# Patient Record
Sex: MALE | Race: BLACK OR AFRICAN AMERICAN | NOT HISPANIC OR LATINO | Employment: FULL TIME | URBAN - METROPOLITAN AREA
[De-identification: names, ages, dates, MRNs, and addresses within clinical notes are randomized per-mention and may not be internally consistent; named-entity substitution may affect disease eponyms.]

---
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
## 2021-08-03 ENCOUNTER — OFFICE VISIT (OUTPATIENT)
Dept: URGENT CARE | Facility: CLINIC | Age: 21
End: 2021-08-03
Payer: MEDICAID

## 2021-08-03 VITALS
HEIGHT: 75 IN | TEMPERATURE: 97 F | HEART RATE: 69 BPM | OXYGEN SATURATION: 98 % | SYSTOLIC BLOOD PRESSURE: 122 MMHG | WEIGHT: 165 LBS | RESPIRATION RATE: 19 BRPM | DIASTOLIC BLOOD PRESSURE: 81 MMHG | BODY MASS INDEX: 20.51 KG/M2

## 2021-08-03 DIAGNOSIS — Z71.89 EDUCATED ABOUT COVID-19 VIRUS INFECTION: ICD-10-CM

## 2021-08-03 DIAGNOSIS — R06.7 SNEEZING: ICD-10-CM

## 2021-08-03 DIAGNOSIS — Z11.52 ENCOUNTER FOR SCREENING FOR COVID-19: ICD-10-CM

## 2021-08-03 DIAGNOSIS — R05.8 DRY COUGH: Primary | ICD-10-CM

## 2021-08-03 LAB
CTP QC/QA: YES
SARS-COV-2 RDRP RESP QL NAA+PROBE: NEGATIVE

## 2021-08-03 PROCEDURE — 99203 PR OFFICE/OUTPT VISIT, NEW, LEVL III, 30-44 MIN: ICD-10-PCS | Mod: S$GLB,CS,, | Performed by: PHYSICIAN ASSISTANT

## 2021-08-03 PROCEDURE — U0002 COVID-19 LAB TEST NON-CDC: HCPCS | Mod: QW,S$GLB,, | Performed by: PHYSICIAN ASSISTANT

## 2021-08-03 PROCEDURE — 99203 OFFICE O/P NEW LOW 30 MIN: CPT | Mod: S$GLB,CS,, | Performed by: PHYSICIAN ASSISTANT

## 2021-08-03 PROCEDURE — U0002: ICD-10-PCS | Mod: QW,S$GLB,, | Performed by: PHYSICIAN ASSISTANT

## 2023-08-15 ENCOUNTER — OFFICE VISIT (OUTPATIENT)
Dept: URGENT CARE | Facility: CLINIC | Age: 23
End: 2023-08-15
Payer: MEDICAID

## 2023-08-15 VITALS
BODY MASS INDEX: 20.51 KG/M2 | HEIGHT: 75 IN | SYSTOLIC BLOOD PRESSURE: 124 MMHG | WEIGHT: 165 LBS | HEART RATE: 60 BPM | RESPIRATION RATE: 15 BRPM | TEMPERATURE: 98 F | OXYGEN SATURATION: 100 % | DIASTOLIC BLOOD PRESSURE: 84 MMHG

## 2023-08-15 DIAGNOSIS — Z11.3 SCREENING FOR STDS (SEXUALLY TRANSMITTED DISEASES): Primary | ICD-10-CM

## 2023-08-15 LAB
BILIRUB UR QL STRIP: NEGATIVE
GLUCOSE UR QL STRIP: NEGATIVE
KETONES UR QL STRIP: NEGATIVE
LEUKOCYTE ESTERASE UR QL STRIP: NEGATIVE
PH, POC UA: 8 (ref 5–8)
POC BLOOD, URINE: NEGATIVE
POC NITRATES, URINE: NEGATIVE
PROT UR QL STRIP: NEGATIVE
SP GR UR STRIP: 1.01 (ref 1–1.03)
UROBILINOGEN UR STRIP-ACNC: POSITIVE (ref 0.3–2.2)

## 2023-08-15 PROCEDURE — 81003 POCT URINALYSIS, DIPSTICK, AUTOMATED, W/O SCOPE: ICD-10-PCS | Mod: QW,S$GLB,, | Performed by: NURSE PRACTITIONER

## 2023-08-15 PROCEDURE — 36415 COLL VENOUS BLD VENIPUNCTURE: CPT | Performed by: NURSE PRACTITIONER

## 2023-08-15 PROCEDURE — 99213 OFFICE O/P EST LOW 20 MIN: CPT | Mod: S$GLB,,, | Performed by: NURSE PRACTITIONER

## 2023-08-15 PROCEDURE — 87591 N.GONORRHOEAE DNA AMP PROB: CPT | Performed by: NURSE PRACTITIONER

## 2023-08-15 PROCEDURE — 80074 ACUTE HEPATITIS PANEL: CPT | Performed by: NURSE PRACTITIONER

## 2023-08-15 PROCEDURE — 87389 HIV-1 AG W/HIV-1&-2 AB AG IA: CPT | Performed by: NURSE PRACTITIONER

## 2023-08-15 PROCEDURE — 87661 TRICHOMONAS VAGINALIS AMPLIF: CPT | Performed by: NURSE PRACTITIONER

## 2023-08-15 PROCEDURE — 99213 PR OFFICE/OUTPT VISIT, EST, LEVL III, 20-29 MIN: ICD-10-PCS | Mod: S$GLB,,, | Performed by: NURSE PRACTITIONER

## 2023-08-15 PROCEDURE — 86592 SYPHILIS TEST NON-TREP QUAL: CPT | Performed by: NURSE PRACTITIONER

## 2023-08-15 PROCEDURE — 81003 URINALYSIS AUTO W/O SCOPE: CPT | Mod: QW,S$GLB,, | Performed by: NURSE PRACTITIONER

## 2023-08-15 NOTE — PROGRESS NOTES
"Subjective:      Patient ID: Mello Grant is a 23 y.o. male.    Vitals:  height is 6' 3" (1.905 m) and weight is 74.8 kg (165 lb). His temperature is 98 °F (36.7 °C). His blood pressure is 124/84 and his pulse is 60. His respiration is 15 and oxygen saturation is 100%.     Chief Complaint: STD Check    23 year old male presents today with with concerns for STDs. States he is not having any symptoms but would like to be tested for everything. Denies any exposure    Male  Problem  The patient's pertinent negatives include no genital injury, genital itching, genital lesions, pelvic pain, penile discharge, penile pain, priapism, scrotal swelling or testicular pain. This is a new problem. The current episode started in the past 7 days. The problem has been unchanged. The patient is experiencing no pain. Nothing aggravates the symptoms. He has tried nothing for the symptoms.       Genitourinary:  Negative for penile discharge, penile pain, scrotal swelling, testicular pain and pelvic pain.      Objective:     Physical Exam   Constitutional: He is oriented to person, place, and time. He appears well-developed. No distress.   HENT:   Head: Normocephalic and atraumatic.   Ears:   Right Ear: External ear normal.   Left Ear: External ear normal.   Nose: Nose normal. No nasal deformity. No epistaxis.   Mouth/Throat: Oropharynx is clear and moist and mucous membranes are normal.   Eyes: Conjunctivae and lids are normal.   Neck: Trachea normal and phonation normal. Neck supple.   Cardiovascular: Normal rate, regular rhythm and normal heart sounds.   Pulmonary/Chest: Effort normal and breath sounds normal.   Abdominal: Normal appearance and bowel sounds are normal. He exhibits no distension. Soft. There is no abdominal tenderness.   Musculoskeletal: Normal range of motion.         General: Normal range of motion.   Neurological: He is alert and oriented to person, place, and time. He has normal reflexes.   Skin: Skin is warm, " dry, intact and not diaphoretic.   Psychiatric: His speech is normal and behavior is normal. Judgment and thought content normal.   Nursing note and vitals reviewed.    Results for orders placed or performed in visit on 08/15/23   POCT Urinalysis, Dipstick, Automated, W/O Scope   Result Value Ref Range    POC Blood, Urine Negative Negative    POC Bilirubin, Urine Negative Negative    POC Urobilinogen, Urine POSITIVE 0.3 - 2.2    POC Ketones, Urine Negative Negative    POC Protein, Urine Negative Negative    POC Nitrates, Urine Negative Negative    POC Glucose, Urine Negative Negative    pH, UA 8.0 5 - 8    POC Specific Gravity, Urine 1.015 1.003 - 1.029    POC Leukocytes, Urine Negative Negative         Patient in no acute distress.  Vitals reassuring.Pt asymptomatic. STD precautions discussed.   Discussed results/diagnosis/plan in depth with patient in clinic. Strict precautions given to patient to monitor for worsening signs and symptoms. Advised to follow up with primary.All questions answered. Strict ER precautions given. If your symptoms worsens or fail to improve you should go to the Emergency Room. Discharge and follow-up instructions given verbally/printed. Discharge and follow-up instructions discussed with the patient who expressed understanding and willingness to comply with my recommendations.Patient voiced understanding and in agreement with current treatment plan.     Please be advised this text was dictated with [employer] software and may contain errors due to translation.    Assessment:     1. Screening for STDs (sexually transmitted diseases)        Plan:       Screening for STDs (sexually transmitted diseases)  -     POCT Urinalysis, Dipstick, Automated, W/O Scope  -     C. trachomatis/N. gonorrhoeae by AMP DNA [facility]; Urine  -     Trichomonas vaginalis, RNA, Qual, Urine  -     HIV 1/2 Ag/Ab (4th Gen)  -     RPR  -     Hepatitis Panel, Acute                  Patient Instructions   STD Screening  You  were tested for STDs today, we will call you in 5-7 days with the results of the testing    If you need more medication when the labs result come in, we will call you and phone them in for you.    Increase condom use to prevent occurance.      IF POSITIVE:  Notify sexual partners of the need for testing.    Complete ALL medication prescribed.    NO sexual intercourse for 7 days after treatment.   Retest to ensure infection has cleared-there are infections that require more agressive treatment.  Retest for all in 6 weeks and again in 6 months to ensure true negative results.      Today's testing will give no crediable information if you have unprotected sexual activities going forward.     Syphillis cases are rising!    Gonorrhea has RESISTANT strains which is why repeat testing after treatment is important.    Gonorrhea may be present in multiple sites from just ONE area of exposure.      REMEMBER WEAR CONDOMS AND GET TESTED OFTEN.

## 2023-08-15 NOTE — PATIENT INSTRUCTIONS
STD Screening  You were tested for STDs today, we will call you in 5-7 days with the results of the testing    If you need more medication when the labs result come in, we will call you and phone them in for you.    Increase condom use to prevent occurance.      IF POSITIVE:  Notify sexual partners of the need for testing.    Complete ALL medication prescribed.    NO sexual intercourse for 7 days after treatment.   Retest to ensure infection has cleared-there are infections that require more agressive treatment.  Retest for all in 6 weeks and again in 6 months to ensure true negative results.      Today's testing will give no crediable information if you have unprotected sexual activities going forward.     Syphillis cases are rising!    Gonorrhea has RESISTANT strains which is why repeat testing after treatment is important.    Gonorrhea may be present in multiple sites from just ONE area of exposure.      REMEMBER WEAR CONDOMS AND GET TESTED OFTEN.

## 2023-08-17 LAB
C TRACH DNA SPEC QL NAA+PROBE: NOT DETECTED
HAV IGM SERPL QL IA: NORMAL
HBV CORE IGM SERPL QL IA: NORMAL
HBV SURFACE AG SERPL QL IA: NORMAL
HCV AB SERPL QL IA: NORMAL
HIV 1+2 AB+HIV1 P24 AG SERPL QL IA: NORMAL
N GONORRHOEA DNA SPEC QL NAA+PROBE: NOT DETECTED
RPR SER QL: NORMAL
SPECIMEN SOURCE: NORMAL
T VAGINALIS RRNA SPEC QL NAA+PROBE: NEGATIVE

## 2023-08-18 ENCOUNTER — TELEPHONE (OUTPATIENT)
Dept: URGENT CARE | Facility: CLINIC | Age: 23
End: 2023-08-18
Payer: MEDICAID

## 2023-08-18 NOTE — TELEPHONE ENCOUNTER
Called patient to give negative test results from previous visit.  He is asymptomatic and requested STD screen.  Reiterated STD precautions.  Answered all questions.  Patient verbalized understanding and agree plan of care.      Results for orders placed or performed in visit on 08/15/23   C. trachomatis/N. gonorrhoeae by AMP DNA [facility]; Urine    Specimen: Genital   Result Value Ref Range    Chlamydia, Amplified DNA Not Detected Not Detected    N gonorrhoeae, amplified DNA Not Detected Not Detected   Trichomonas vaginalis, RNA, Qual, Urine    Specimen: Urine   Result Value Ref Range    Trichomonas vaginalis RNA, Qual, source Urine     Trichomonas vaginalis, QL, TMA Negative Negative   HIV 1/2 Ag/Ab (4th Gen)   Result Value Ref Range    HIV 1/2 Ag/Ab Non-reactive Non-reactive   RPR   Result Value Ref Range    RPR Non-reactive Non-reactive   Hepatitis Panel, Acute   Result Value Ref Range    Hepatitis B Surface Ag Non-reactive Non-reactive    Hep B C IgM Non-reactive Non-reactive    Hep A IgM Non-reactive Non-reactive    Hepatitis C Ab Non-reactive Non-reactive   POCT Urinalysis, Dipstick, Automated, W/O Scope   Result Value Ref Range    POC Blood, Urine Negative Negative    POC Bilirubin, Urine Negative Negative    POC Urobilinogen, Urine POSITIVE 0.3 - 2.2    POC Ketones, Urine Negative Negative    POC Protein, Urine Negative Negative    POC Nitrates, Urine Negative Negative    POC Glucose, Urine Negative Negative    pH, UA 8.0 5 - 8    POC Specific Gravity, Urine 1.015 1.003 - 1.029    POC Leukocytes, Urine Negative Negative

## 2024-05-21 ENCOUNTER — OFFICE VISIT (OUTPATIENT)
Dept: URGENT CARE | Facility: CLINIC | Age: 24
End: 2024-05-21
Payer: MEDICAID

## 2024-05-21 VITALS
HEART RATE: 98 BPM | WEIGHT: 164.88 LBS | HEIGHT: 75 IN | TEMPERATURE: 98 F | DIASTOLIC BLOOD PRESSURE: 78 MMHG | RESPIRATION RATE: 17 BRPM | SYSTOLIC BLOOD PRESSURE: 118 MMHG | OXYGEN SATURATION: 96 % | BODY MASS INDEX: 20.5 KG/M2

## 2024-05-21 DIAGNOSIS — Z11.52 ENCOUNTER FOR SCREENING FOR COVID-19: ICD-10-CM

## 2024-05-21 DIAGNOSIS — J02.9 ACUTE VIRAL PHARYNGITIS: ICD-10-CM

## 2024-05-21 DIAGNOSIS — H61.23 EXCESSIVE EAR WAX, BILATERAL: ICD-10-CM

## 2024-05-21 DIAGNOSIS — Z11.3 SCREENING EXAMINATION FOR STD (SEXUALLY TRANSMITTED DISEASE): ICD-10-CM

## 2024-05-21 DIAGNOSIS — J06.9 VIRAL URI WITH COUGH: Primary | ICD-10-CM

## 2024-05-21 LAB
BILIRUB UR QL STRIP: NEGATIVE
CTP QC/QA: YES
CTP QC/QA: YES
GLUCOSE UR QL STRIP: NEGATIVE
KETONES UR QL STRIP: NEGATIVE
LEUKOCYTE ESTERASE UR QL STRIP: NEGATIVE
MOLECULAR STREP A: NEGATIVE
PH, POC UA: 5.5
POC BLOOD, URINE: NEGATIVE
POC NITRATES, URINE: NEGATIVE
PROT UR QL STRIP: NEGATIVE
SARS-COV-2 AG RESP QL IA.RAPID: NEGATIVE
SP GR UR STRIP: 1.02 (ref 1–1.03)
UROBILINOGEN UR STRIP-ACNC: NORMAL (ref 0.3–2.2)

## 2024-05-21 PROCEDURE — 87491 CHLMYD TRACH DNA AMP PROBE: CPT | Performed by: PHYSICIAN ASSISTANT

## 2024-05-21 PROCEDURE — 99214 OFFICE O/P EST MOD 30 MIN: CPT | Mod: S$GLB,,, | Performed by: PHYSICIAN ASSISTANT

## 2024-05-21 PROCEDURE — 87661 TRICHOMONAS VAGINALIS AMPLIF: CPT | Performed by: PHYSICIAN ASSISTANT

## 2024-05-21 PROCEDURE — 87651 STREP A DNA AMP PROBE: CPT | Mod: QW,S$GLB,, | Performed by: PHYSICIAN ASSISTANT

## 2024-05-21 PROCEDURE — 87811 SARS-COV-2 COVID19 W/OPTIC: CPT | Mod: QW,S$GLB,, | Performed by: PHYSICIAN ASSISTANT

## 2024-05-21 PROCEDURE — 81003 URINALYSIS AUTO W/O SCOPE: CPT | Mod: QW,S$GLB,, | Performed by: PHYSICIAN ASSISTANT

## 2024-05-21 RX ORDER — CETIRIZINE HYDROCHLORIDE 10 MG/1
10 TABLET ORAL DAILY PRN
Qty: 30 TABLET | Refills: 0 | Status: SHIPPED | OUTPATIENT
Start: 2024-05-21 | End: 2025-05-21

## 2024-05-21 RX ORDER — FLUTICASONE PROPIONATE 50 MCG
1 SPRAY, SUSPENSION (ML) NASAL 2 TIMES DAILY PRN
Qty: 16 G | Refills: 0 | Status: SHIPPED | OUTPATIENT
Start: 2024-05-21

## 2024-05-21 NOTE — PROGRESS NOTES
"Subjective:      Patient ID: Mello Grant is a 24 y.o. male.    Vitals:  height is 6' 3" (1.905 m) and weight is 74.8 kg (164 lb 14.5 oz). His oral temperature is 98.3 °F (36.8 °C). His blood pressure is 118/78 and his pulse is 98. His respiration is 17 and oxygen saturation is 96%.     Chief Complaint: Sore Throat     24-year-old male presents to urgent care clinic for evaluation.  He is here with multiple complaints.  Complaining of sore throat, nasal congestion, runny nose, postnasal drip, mild productive cough 4 days.  Not taking anything for symptoms.  Symptoms gradually improving spontaneously.  Requesting strep and COVID testing done.  While he is here, he would like   Urine STD testing done.  Denies any symptoms or concern for STD exposure.    Sore Throat   This is a new problem. The current episode started in the past 7 days. The problem has been unchanged. There has been no fever. The pain is at a severity of 3/10. The pain is mild. Associated symptoms include congestion, coughing and swollen glands. Pertinent negatives include no abdominal pain, diarrhea, drooling, ear discharge, ear pain, headaches, hoarse voice, plugged ear sensation, neck pain, shortness of breath, stridor, trouble swallowing or vomiting. He has had no exposure to strep or mono. The treatment provided mild relief.       Constitution: Negative for activity change, chills, sweating, fatigue, fever and generalized weakness.   HENT:  Positive for congestion, postnasal drip and sore throat. Negative for ear pain, ear discharge, hearing loss, drooling, facial swelling, sinus pain, sinus pressure, trouble swallowing and voice change.    Neck: Negative for neck pain, neck stiffness and painful lymph nodes.   Cardiovascular:  Negative for chest pain, leg swelling, palpitations, sob on exertion and passing out.   Eyes:  Negative for eye discharge, eye pain, eye redness, photophobia, vision loss, double vision, blurred vision and eyelid swelling. "   Respiratory:  Positive for cough and sputum production. Negative for chest tightness, COPD, shortness of breath, stridor, wheezing and asthma.    Gastrointestinal:  Negative for abdominal pain, nausea, vomiting, diarrhea, bright red blood in stool, dark colored stools, rectal bleeding, heartburn and bowel incontinence.   Genitourinary:  Negative for dysuria, frequency, urgency, urine decreased, flank pain, bladder incontinence, hematuria and history of kidney stones.   Musculoskeletal:  Negative for trauma, joint pain, joint swelling, abnormal ROM of joint, muscle cramps and muscle ache.   Skin:  Negative for color change, pale, rash and wound.   Allergic/Immunologic: Positive for environmental allergies and seasonal allergies. Negative for asthma and immunocompromised state.   Neurological:  Negative for dizziness, history of vertigo, light-headedness, passing out, facial drooping, speech difficulty, coordination disturbances, loss of balance, headaches, disorientation, altered mental status, loss of consciousness, numbness, tingling and seizures.   Hematologic/Lymphatic: Negative for swollen lymph nodes, easy bruising/bleeding and trouble clotting. Does not bruise/bleed easily.   Psychiatric/Behavioral:  Negative for altered mental status and disorientation.       Objective:     Physical Exam   Constitutional: He is oriented to person, place, and time. He appears well-developed. He is cooperative. He does not appear ill. No distress.   HENT:   Head: Normocephalic.   Ears:   Right Ear: Hearing, external ear and ear canal normal. No no drainage, swelling or tenderness. No mastoid tenderness. impacted cerumen  Left Ear: Hearing, external ear and ear canal normal. No no drainage, swelling or tenderness. No mastoid tenderness. impacted cerumen  Nose: Nose normal. No rhinorrhea or congestion. Right sinus exhibits no maxillary sinus tenderness and no frontal sinus tenderness. Left sinus exhibits no maxillary sinus  tenderness and no frontal sinus tenderness.   Mouth/Throat: Uvula is midline, oropharynx is clear and moist and mucous membranes are normal. Mucous membranes are moist. No oral lesions. No trismus in the jaw. No uvula swelling. No oropharyngeal exudate, posterior oropharyngeal edema, posterior oropharyngeal erythema or tonsillar abscesses. No tonsillar exudate. Oropharynx is clear.   Eyes: Conjunctivae, EOM and lids are normal. Right eye exhibits no discharge. Left eye exhibits no discharge. Right conjunctiva is not injected. Right conjunctiva has no hemorrhage. Left conjunctiva is not injected. Left conjunctiva has no hemorrhage. Extraocular movement intact vision grossly intact gaze aligned appropriately   Neck: Phonation normal. Neck supple. No neck rigidity present.   Cardiovascular: Normal rate, regular rhythm, normal heart sounds and normal pulses.   No murmur heard.  Pulmonary/Chest: Effort normal and breath sounds normal. No accessory muscle usage. No respiratory distress. He has no wheezes. He exhibits no tenderness.   Abdominal: Normal appearance. He exhibits no distension. Soft. There is no abdominal tenderness. There is no rebound, no guarding, no left CVA tenderness and no right CVA tenderness.   Musculoskeletal: Normal range of motion.         General: Normal range of motion.      Comments: Moves all extremities with normal tone, strength, and ROM.  Gait normal.   Lymphadenopathy:     He has no cervical adenopathy.        Right cervical: No superficial cervical adenopathy present.       Left cervical: No superficial cervical adenopathy present.   Neurological: no focal deficit. He is alert, oriented to person, place, and time and at baseline. He has normal motor skills and normal sensation. He displays facial symmetry and no dysarthria. He exhibits normal muscle tone. Gait and coordination normal. Coordination normal. GCS eye subscore is 4. GCS verbal subscore is 5. GCS motor subscore is 6.   Skin:  Skin is warm, dry and no rash. Capillary refill takes less than 2 seconds.   Psychiatric: He experiences Normal attention. His speech is normal and behavior is normal. Thought content normal.   Nursing note and vitals reviewed.    Results for orders placed or performed in visit on 05/21/24   SARS Coronavirus 2 Antigen, POCT Manual Read   Result Value Ref Range    SARS Coronavirus 2 Antigen Negative Negative     Acceptable Yes    POCT Strep A, Molecular   Result Value Ref Range    Molecular Strep A, POC Negative Negative     Acceptable Yes    POCT Urinalysis, Dipstick, Automated, W/O Scope   Result Value Ref Range    POC Blood, Urine Negative Negative    POC Bilirubin, Urine Negative Negative    POC Urobilinogen, Urine Norm 0.3 - 2.2    POC Ketones, Urine Negative Negative    POC Protein, Urine Negative Negative    POC Nitrates, Urine Negative Negative    POC Glucose, Urine Negative Negative    pH, UA 5.5     POC Specific Gravity, Urine 1.025 1.003 - 1.029    POC Leukocytes, Urine Negative Negative       Assessment:     1. Viral URI with cough    2. Acute viral pharyngitis    3. Screening examination for STD (sexually transmitted disease)    4. Excessive ear wax, bilateral    5. Encounter for screening for COVID-19      Note dictated with voice recognition software, please excuse any grammatical errors.    Patient presents with clinical exam findings and history consistent with above.  We discussed the differential diagnosis.    On exam, patient is nontoxic appearing and vitals are stable.  Patient is essentially neurovascularly intact on exam.      Diagnostic testing results were independently reviewed and interpreted, which were discussed in depth with patient.   Test ordered in clinic:   POCT urinalysis within normal limits,   COVID strep A negative.    Urine  GC and Trichomonas sent.  Additionally, previous progress notes/admissions/lab were reviewed and interpreted.    Previous ED  progress note 08/24/2019, 09/01/2019, and 12/21/2019 reviewed.    We had shared medical decision making for patient's treatment and care.    Plan:     Emphasized importance of prescribed and OTC symptomatic treatment to prevent worsening of condition.      Discussed STD precautions.      Offered ear wax removal in clinic but patient declined and will follow up with his PCP instead.    Viral URI with cough  -     SARS Coronavirus 2 Antigen, POCT Manual Read  -     POCT Strep A, Molecular  -     POCT Urinalysis, Dipstick, Automated, W/O Scope  -     cetirizine (ZYRTEC) 10 MG tablet; Take 1 tablet (10 mg total) by mouth daily as needed for Allergies or Rhinitis.  Dispense: 30 tablet; Refill: 0  -     fluticasone propionate (FLONASE) 50 mcg/actuation nasal spray; 1 spray (50 mcg total) by Each Nostril route 2 (two) times daily as needed for Rhinitis or Allergies.  Dispense: 16 g; Refill: 0  -     dextromethorphan-guaiFENesin  mg (MUCINEX DM)  mg per 12 hr tablet; Take 1 tablet by mouth every 12 (twelve) hours as needed (cough and chest congestion). Take with 1 full glass of water.  Dispense: 30 tablet; Refill: 0    Acute viral pharyngitis  -     cetirizine (ZYRTEC) 10 MG tablet; Take 1 tablet (10 mg total) by mouth daily as needed for Allergies or Rhinitis.  Dispense: 30 tablet; Refill: 0  -     fluticasone propionate (FLONASE) 50 mcg/actuation nasal spray; 1 spray (50 mcg total) by Each Nostril route 2 (two) times daily as needed for Rhinitis or Allergies.  Dispense: 16 g; Refill: 0  -     dextromethorphan-guaiFENesin  mg (MUCINEX DM)  mg per 12 hr tablet; Take 1 tablet by mouth every 12 (twelve) hours as needed (cough and chest congestion). Take with 1 full glass of water.  Dispense: 30 tablet; Refill: 0    Screening examination for STD (sexually transmitted disease)  -     C. trachomatis/N. gonorrhoeae by AMP DNA [facility]; Urine  -     Trichomonas vaginalis, RNA, Qual, Urine    Excessive ear  wax, bilateral    Encounter for screening for COVID-19      Note dictated with voice recognition software, please excuse any grammatical errors.    We had shared decision making for patient's treatment. We discussed side effects/alternatives/benefits/risk and patient would like to proceed with treatment plan. We also discussed other OTC treatment recommendations.    Patient was counseled, explained with the test results meaning, expected course, and answered all of questions. They can also receive results via my chart.        Patient was instructed to return for re-evaluation with urgent care or PCP for continued outpatient workup and management if symptoms do not improve/worsening symptoms. Strict ED versus clinic precautions given in depth.   Guideline, discharge and follow-up instructions given verbally/printed; patient will also receive via [employer]. Patient verbalized understanding and agreed with the entirety of plan of care.    Patient Instructions     PLEASE READ YOUR DISCHARGE INSTRUCTIONS ENTIRELY AS IT CONTAINS IMPORTANT INFORMATION.    Patient had covid testing done today.      If Negative and has direct exposure:  Symptom free without fever reducing meds for 24 hours may return to work with a mask on for the next 7-10 days at all times.  Return for COVID testing here if symptoms worsens in 5-7 days. Recommend repeat testing at home every 48 hours for 7 days.  Return sooner for evaluation if new or worsening symptoms.  You may also use home COVID test to check.    Discussed corona virus precautions and reviewed CDC FAC; printed a copy for patient.  I discussed to continue to monitor their symptoms. Discussed that if their symptoms persist or worsen to seek re-evaluation. Clinic vs. ER precautions were given.  Patient verbalized understanding and agreed with the above plan of care.    - Reviewed radiographs and all diagnostic testing with patient/family.    - Rest.  Drink plenty of fluids.    -  Tylenol/anti-inflammatory(ibuprofen/motrin/aleve) as directed as needed for fever/pain.  For Tylenol, do not exceed 3000 mg/ day. If no contraindication.  -OK to supplement with OTC DayQuil, NyQuil or TheraFlu every 6 hours as needed for cough and congestion.  Use caution of total amount of Tylenol/acetaminophen per day.    -Below are suggestions for symptomatic relief:              -Salt water gargles to soothe throat pain.              -Chloroseptic spray also helps to numb throat pain.              -Nasal saline spray reduces inflammation and dryness.  Drink hot tea with lemon to help soothe your sore throat.              -Warm face compresses to help with facial sinus pain/pressure.              -Vicks vapor rub at night.              -Flonase OTC or Nasacort OTC  once or twice a day a day for nasal/sinus congestion and runny nose. DON'T USE IF YOU HAVE GLAUCOMA. CHECK WITH YOUR PHARMACIST/PHYSICIAN.              -Simple foods like chicken noodle soup.              -Mucinex DM every 12 hours (ANY COUGH EXPECTORANT--guaifenesin) for cough or chest congestion with mucus    (ANY COUGH SUPPRESSANT--dextromethorphan) helps with coughing at night. Mucinex-DM if you have chest congestion or sputum (caution if history of high blood pressure or palpitations).              -Zyrtec/Claritin/xyzal during the day time  & Benadryl at night (only if severe runny nose) may help with allergies and runny nose. Add decongestant if you have nasal/sinus congestion/sinus pressure/ear fullness sensation. (see below)             STD Screening    You were tested for sexual transmitted diseases today, we will call you in 3-7 days with the results of the testing. If you need more medication when the labs result come in, we will call you and phone them in for you.  PLEASE SET UP YOUR [employer] ACCOUNT SO THAT YOU CAN RECEIVE YOUR LAB RESULTS ONCE THEY RETURN.  Increase condom use to prevent further occurance.    Please remain abstinent until  further notice.       IF POSITIVE:  Notify sexual partners of the need for testing.    NO sexual intercourse until given all lab results and appropriate treatment.     Complete ALL medications prescribed (if required).  Please supplement with OTC probiotics and yogurt if prescribed antibiotics.   NO sexual intercourse for 7-14 days after treatment (if required).     Retest to ensure infection has cleared-there are infections that require more agressive treatment.  Retest for all in 6 weeks (if still have symptoms) and again in 6 months to ensure true negative results.       Today's testing will give no crediable information if you have unprotected sexual activities going forward.     Today's testing will give no crediable information if you have unprotected sexual activities going forward.     Syphillis cases are rising!  Gonorrhea has RESISTANT strains which is why repeat testing after treatment is important.  Gonorrhea may be present in multiple sites from just ONE area of exposure.      REMEMBER WEAR CONDOMS AND GET TESTED OFTEN.         -You must understand that you've received an Urgent Care treatment only and that you may be released before all your medical problems are known or treated. You, the patient, will arrange for follow up care as instructed. Please arrange follow up with your primary medical clinic within 2-5 days if your signs and symptoms have not resolved or worsen.   - Follow up with your PCP or specialty clinic as directed.  You can call [phone] or [phone] to schedule an appointment with the appropriate provider.    - If your condition worsens or fails to improve we recommend that you receive another evaluation at the emergency room immediately or contact your primary medical clinic to discuss your concerns.                Additional MDM:     Heart Failure Score:   COPD = No

## 2024-05-21 NOTE — PATIENT INSTRUCTIONS
PLEASE READ YOUR DISCHARGE INSTRUCTIONS ENTIRELY AS IT CONTAINS IMPORTANT INFORMATION.    Patient had covid testing done today.      If Negative and has direct exposure:  Symptom free without fever reducing meds for 24 hours may return to work with a mask on for the next 7-10 days at all times.  Return for COVID testing here if symptoms worsens in 5-7 days. Recommend repeat testing at home every 48 hours for 7 days.  Return sooner for evaluation if new or worsening symptoms.  You may also use home COVID test to check.    Discussed corona virus precautions and reviewed CDC FAC; printed a copy for patient.  I discussed to continue to monitor their symptoms. Discussed that if their symptoms persist or worsen to seek re-evaluation. Clinic vs. ER precautions were given.  Patient verbalized understanding and agreed with the above plan of care.    - Reviewed radiographs and all diagnostic testing with patient/family.    - Rest.  Drink plenty of fluids.    - Tylenol/anti-inflammatory(ibuprofen/motrin/aleve) as directed as needed for fever/pain.  For Tylenol, do not exceed 3000 mg/ day. If no contraindication.  -OK to supplement with OTC DayQuil, NyQuil or TheraFlu every 6 hours as needed for cough and congestion.  Use caution of total amount of Tylenol/acetaminophen per day.    -Below are suggestions for symptomatic relief:              -Salt water gargles to soothe throat pain.              -Chloroseptic spray also helps to numb throat pain.              -Nasal saline spray reduces inflammation and dryness.  Drink hot tea with lemon to help soothe your sore throat.              -Warm face compresses to help with facial sinus pain/pressure.              -Vicks vapor rub at night.              -Flonase OTC or Nasacort OTC  once or twice a day a day for nasal/sinus congestion and runny nose. DON'T USE IF YOU HAVE GLAUCOMA. CHECK WITH YOUR PHARMACIST/PHYSICIAN.              -Simple foods like chicken noodle soup.               -Mucinex DM every 12 hours (ANY COUGH EXPECTORANT--guaifenesin) for cough or chest congestion with mucus    (ANY COUGH SUPPRESSANT--dextromethorphan) helps with coughing at night. Mucinex-DM if you have chest congestion or sputum (caution if history of high blood pressure or palpitations).              -Zyrtec/Claritin/xyzal during the day time  & Benadryl at night (only if severe runny nose) may help with allergies and runny nose. Add decongestant if you have nasal/sinus congestion/sinus pressure/ear fullness sensation. (see below)             STD Screening    You were tested for sexual transmitted diseases today, we will call you in 3-7 days with the results of the testing. If you need more medication when the labs result come in, we will call you and phone them in for you.  PLEASE SET UP YOUR [facility] ACCOUNT SO THAT YOU CAN RECEIVE YOUR LAB RESULTS ONCE THEY RETURN.  Increase condom use to prevent further occurance.    Please remain abstinent until further notice.       IF POSITIVE:  Notify sexual partners of the need for testing.    NO sexual intercourse until given all lab results and appropriate treatment.     Complete ALL medications prescribed (if required).  Please supplement with OTC probiotics and yogurt if prescribed antibiotics.   NO sexual intercourse for 7-14 days after treatment (if required).     Retest to ensure infection has cleared-there are infections that require more agressive treatment.  Retest for all in 6 weeks (if still have symptoms) and again in 6 months to ensure true negative results.       Today's testing will give no crediable information if you have unprotected sexual activities going forward.     Today's testing will give no crediable information if you have unprotected sexual activities going forward.     Syphillis cases are rising!  Gonorrhea has RESISTANT strains which is why repeat testing after treatment is important.  Gonorrhea may be present in multiple sites from just ONE  area of exposure.      REMEMBER WEAR CONDOMS AND GET TESTED OFTEN.         -You must understand that you've received an Urgent Care treatment only and that you may be released before all your medical problems are known or treated. You, the patient, will arrange for follow up care as instructed. Please arrange follow up with your primary medical clinic within 2-5 days if your signs and symptoms have not resolved or worsen.   - Follow up with your PCP or specialty clinic as directed.  You can call [phone] or [phone] to schedule an appointment with the appropriate provider.    - If your condition worsens or fails to improve we recommend that you receive another evaluation at the emergency room immediately or contact your primary medical clinic to discuss your concerns.

## 2024-05-23 LAB
C TRACH DNA SPEC QL NAA+PROBE: NOT DETECTED
N GONORRHOEA DNA SPEC QL NAA+PROBE: NOT DETECTED
SPECIMEN SOURCE: NORMAL
T VAGINALIS RRNA SPEC QL NAA+PROBE: NEGATIVE

## 2024-05-26 ENCOUNTER — TELEPHONE (OUTPATIENT)
Dept: URGENT CARE | Facility: CLINIC | Age: 24
End: 2024-05-26
Payer: MEDICAID

## 2024-05-26 NOTE — TELEPHONE ENCOUNTER
Called patient and discussed negative STD results. Patient understands. Follow up with PCP in the next 1-2 weeks as needed.  Gave patient strict ER/urgent care precautions in case symptoms worsen or if any new concerns arise.